# Patient Record
Sex: FEMALE | Race: WHITE | Employment: FULL TIME | URBAN - NONMETROPOLITAN AREA
[De-identification: names, ages, dates, MRNs, and addresses within clinical notes are randomized per-mention and may not be internally consistent; named-entity substitution may affect disease eponyms.]

---

## 2024-09-15 ENCOUNTER — HOSPITAL ENCOUNTER (EMERGENCY)
Age: 34
Discharge: HOME | End: 2024-09-15
Attending: EMERGENCY MEDICINE

## 2024-09-15 VITALS
SYSTOLIC BLOOD PRESSURE: 131 MMHG | HEIGHT: 68 IN | WEIGHT: 230 LBS | DIASTOLIC BLOOD PRESSURE: 81 MMHG | HEART RATE: 74 BPM | RESPIRATION RATE: 17 BRPM | OXYGEN SATURATION: 99 % | TEMPERATURE: 98.1 F | BODY MASS INDEX: 34.86 KG/M2

## 2024-09-15 DIAGNOSIS — R23.2 FLUSHING REACTION: ICD-10-CM

## 2024-09-15 DIAGNOSIS — R51.9 ACUTE NONINTRACTABLE HEADACHE, UNSPECIFIED HEADACHE TYPE: Primary | ICD-10-CM

## 2024-09-15 LAB
ALBUMIN SERPL-MCNC: 4.2 G/DL (ref 3.5–5.2)
ALP SERPL-CCNC: 67 U/L (ref 35–104)
ALT SERPL-CCNC: 9 U/L (ref 5–33)
ANION GAP SERPL CALCULATED.3IONS-SCNC: 12 MMOL/L (ref 7–19)
AST SERPL-CCNC: 11 U/L (ref 5–32)
BACTERIA URNS QL MICRO: NEGATIVE /HPF
BASOPHILS # BLD: 0 K/UL (ref 0–0.2)
BASOPHILS NFR BLD: 0.3 % (ref 0–1)
BILIRUB SERPL-MCNC: 0.2 MG/DL (ref 0.2–1.2)
BILIRUB UR QL STRIP: NEGATIVE
BUN SERPL-MCNC: 10 MG/DL (ref 6–20)
CALCIUM SERPL-MCNC: 9.1 MG/DL (ref 8.6–10)
CHLORIDE SERPL-SCNC: 105 MMOL/L (ref 98–111)
CLARITY UR: ABNORMAL
CO2 SERPL-SCNC: 25 MMOL/L (ref 22–29)
COLOR UR: YELLOW
CREAT SERPL-MCNC: 0.8 MG/DL (ref 0.5–0.9)
CRYSTALS URNS MICRO: ABNORMAL /HPF
EOSINOPHIL # BLD: 0.1 K/UL (ref 0–0.6)
EOSINOPHIL NFR BLD: 0.4 % (ref 0–5)
ERYTHROCYTE [DISTWIDTH] IN BLOOD BY AUTOMATED COUNT: 12.6 % (ref 11.5–14.5)
GLUCOSE SERPL-MCNC: 99 MG/DL (ref 70–99)
GLUCOSE UR STRIP.AUTO-MCNC: NEGATIVE MG/DL
HCG SERPL QL: NEGATIVE
HCT VFR BLD AUTO: 39.7 % (ref 37–47)
HGB BLD-MCNC: 13.2 G/DL (ref 12–16)
HGB UR STRIP.AUTO-MCNC: NEGATIVE MG/L
IMM GRANULOCYTES # BLD: 0.1 K/UL
KETONES UR STRIP.AUTO-MCNC: 15 MG/DL
LEUKOCYTE ESTERASE UR QL STRIP.AUTO: ABNORMAL
LYMPHOCYTES # BLD: 2 K/UL (ref 1.1–4.5)
LYMPHOCYTES NFR BLD: 14.6 % (ref 20–40)
MAGNESIUM SERPL-MCNC: 2.2 MG/DL (ref 1.6–2.6)
MCH RBC QN AUTO: 31.4 PG (ref 27–31)
MCHC RBC AUTO-ENTMCNC: 33.2 G/DL (ref 33–37)
MCV RBC AUTO: 94.3 FL (ref 81–99)
MONOCYTES # BLD: 0.9 K/UL (ref 0–0.9)
MONOCYTES NFR BLD: 6.6 % (ref 0–10)
MUCOUS THREADS URNS QL MICRO: ABNORMAL /LPF
NEUTROPHILS # BLD: 10.8 K/UL (ref 1.5–7.5)
NEUTS SEG NFR BLD: 77.5 % (ref 50–65)
NITRITE UR QL STRIP.AUTO: NEGATIVE
PH UR STRIP.AUTO: 6.5 [PH] (ref 5–8)
PLATELET # BLD AUTO: 305 K/UL (ref 130–400)
PMV BLD AUTO: 10.7 FL (ref 9.4–12.3)
POTASSIUM SERPL-SCNC: 3.6 MMOL/L (ref 3.5–5)
PROT SERPL-MCNC: 7.6 G/DL (ref 6.4–8.3)
PROT UR STRIP.AUTO-MCNC: ABNORMAL MG/DL
RBC # BLD AUTO: 4.21 M/UL (ref 4.2–5.4)
RBC #/AREA URNS HPF: ABNORMAL /HPF (ref 0–2)
SODIUM SERPL-SCNC: 142 MMOL/L (ref 136–145)
SP GR UR STRIP.AUTO: 1.03 (ref 1–1.03)
SQUAMOUS #/AREA URNS HPF: ABNORMAL /HPF
TSH SERPL DL<=0.005 MIU/L-ACNC: 1.45 UIU/ML (ref 0.27–4.2)
UROBILINOGEN UR STRIP.AUTO-MCNC: 1 E.U./DL
WBC # BLD AUTO: 13.9 K/UL (ref 4.8–10.8)
WBC #/AREA URNS HPF: ABNORMAL /HPF (ref 0–5)

## 2024-09-15 PROCEDURE — 84443 ASSAY THYROID STIM HORMONE: CPT

## 2024-09-15 PROCEDURE — 83735 ASSAY OF MAGNESIUM: CPT

## 2024-09-15 PROCEDURE — 99284 EMERGENCY DEPT VISIT MOD MDM: CPT

## 2024-09-15 PROCEDURE — 36415 COLL VENOUS BLD VENIPUNCTURE: CPT

## 2024-09-15 PROCEDURE — 81001 URINALYSIS AUTO W/SCOPE: CPT

## 2024-09-15 PROCEDURE — 85025 COMPLETE CBC W/AUTO DIFF WBC: CPT

## 2024-09-15 PROCEDURE — 93005 ELECTROCARDIOGRAM TRACING: CPT | Performed by: EMERGENCY MEDICINE

## 2024-09-15 PROCEDURE — 84703 CHORIONIC GONADOTROPIN ASSAY: CPT

## 2024-09-15 PROCEDURE — 80053 COMPREHEN METABOLIC PANEL: CPT

## 2024-09-16 LAB
EKG P AXIS: 20 DEGREES
EKG P-R INTERVAL: 184 MS
EKG Q-T INTERVAL: 426 MS
EKG QRS DURATION: 96 MS
EKG QTC CALCULATION (BAZETT): 425 MS
EKG T AXIS: 50 DEGREES

## 2024-09-16 PROCEDURE — 93010 ELECTROCARDIOGRAM REPORT: CPT | Performed by: INTERNAL MEDICINE

## 2025-02-12 DIAGNOSIS — Z00.00 ANNUAL PHYSICAL EXAM: Primary | ICD-10-CM

## 2025-02-12 DIAGNOSIS — F31.4 BIPOLAR DISORDER, CURRENT EPISODE DEPRESSED, SEVERE, WITHOUT PSYCHOTIC FEATURES (HCC): ICD-10-CM

## 2025-02-12 DIAGNOSIS — F41.1 GAD (GENERALIZED ANXIETY DISORDER): ICD-10-CM

## 2025-02-12 RX ORDER — CITALOPRAM HYDROBROMIDE 10 MG/1
10 TABLET ORAL DAILY
Qty: 30 TABLET | Refills: 0 | Status: SHIPPED | OUTPATIENT
Start: 2025-02-12

## 2025-02-12 NOTE — TELEPHONE ENCOUNTER
Gave 30 days with no refill.  Patient needs to schedule physical.  Patient has not been seen since January 2024

## 2025-02-12 NOTE — TELEPHONE ENCOUNTER
We can order routine labs but I would recommend her coming to the appointment fasting then go down after the visit for lab work in case she needs additional labs.

## 2025-02-12 NOTE — TELEPHONE ENCOUNTER
Call returned to pt to let her know. She has scheduled an apt on 3/7/25 @9 am and is wanting to get labs done that morning prior to the apt. Will send back to MM for lab orders

## 2025-02-12 NOTE — TELEPHONE ENCOUNTER
Received call/My Chart Message from patient requesting refill on medication(s). Pt was last seen in office on 1/10/24 and has a follow up scheduled for Visit date not found. Pt states she knows she needs to come in but she does not have insurance right now.     Will send request to provider for authorization.         Requested Prescriptions     Pending Prescriptions Disp Refills    citalopram (CELEXA) 10 MG tablet 90 tablet 1     Sig: Take 1 tablet by mouth daily

## 2025-03-07 ENCOUNTER — OFFICE VISIT (OUTPATIENT)
Age: 35
End: 2025-03-07

## 2025-03-07 VITALS
HEART RATE: 79 BPM | HEIGHT: 68 IN | DIASTOLIC BLOOD PRESSURE: 60 MMHG | OXYGEN SATURATION: 98 % | TEMPERATURE: 97.2 F | WEIGHT: 238.38 LBS | BODY MASS INDEX: 36.13 KG/M2 | SYSTOLIC BLOOD PRESSURE: 100 MMHG

## 2025-03-07 DIAGNOSIS — Z00.00 ANNUAL PHYSICAL EXAM: ICD-10-CM

## 2025-03-07 DIAGNOSIS — F31.32 BIPOLAR AFFECTIVE DISORDER, CURRENTLY DEPRESSED, MODERATE (HCC): ICD-10-CM

## 2025-03-07 DIAGNOSIS — Z72.89 CURRENT EVERY DAY VAPING: ICD-10-CM

## 2025-03-07 DIAGNOSIS — F41.1 GAD (GENERALIZED ANXIETY DISORDER): ICD-10-CM

## 2025-03-07 DIAGNOSIS — Z00.00 ENCOUNTER FOR WELL ADULT EXAM WITHOUT ABNORMAL FINDINGS: Primary | ICD-10-CM

## 2025-03-07 LAB
ALBUMIN SERPL-MCNC: 4.5 G/DL (ref 3.5–5.2)
ALP SERPL-CCNC: 68 U/L (ref 35–104)
ALT SERPL-CCNC: 12 U/L (ref 10–35)
ANION GAP SERPL CALCULATED.3IONS-SCNC: 12 MMOL/L (ref 8–16)
AST SERPL-CCNC: 14 U/L (ref 10–35)
BASOPHILS # BLD: 0.1 K/UL (ref 0–0.2)
BASOPHILS NFR BLD: 1 % (ref 0–1)
BILIRUB SERPL-MCNC: 0.4 MG/DL (ref 0.2–1.2)
BUN SERPL-MCNC: 6 MG/DL (ref 6–20)
CALCIUM SERPL-MCNC: 9.4 MG/DL (ref 8.6–10)
CHLORIDE SERPL-SCNC: 104 MMOL/L (ref 98–107)
CHOLEST SERPL-MCNC: 156 MG/DL (ref 0–199)
CO2 SERPL-SCNC: 24 MMOL/L (ref 22–29)
CREAT SERPL-MCNC: 0.8 MG/DL (ref 0.5–0.9)
CREAT UR-MCNC: 290 MG/DL (ref 28–217)
EOSINOPHIL # BLD: 0.2 K/UL (ref 0–0.6)
EOSINOPHIL NFR BLD: 3.3 % (ref 0–5)
ERYTHROCYTE [DISTWIDTH] IN BLOOD BY AUTOMATED COUNT: 12.9 % (ref 11.5–14.5)
GLUCOSE SERPL-MCNC: 94 MG/DL (ref 70–99)
HBA1C MFR BLD: 5.2 % (ref 4–5.6)
HCT VFR BLD AUTO: 45.5 % (ref 37–47)
HDLC SERPL-MCNC: 42 MG/DL (ref 40–60)
HGB BLD-MCNC: 14.8 G/DL (ref 12–16)
IMM GRANULOCYTES # BLD: 0 K/UL
LDLC SERPL CALC-MCNC: 97 MG/DL
LYMPHOCYTES # BLD: 2 K/UL (ref 1.1–4.5)
LYMPHOCYTES NFR BLD: 28.7 % (ref 20–40)
MCH RBC QN AUTO: 30.5 PG (ref 27–31)
MCHC RBC AUTO-ENTMCNC: 32.5 G/DL (ref 33–37)
MCV RBC AUTO: 93.6 FL (ref 81–99)
MICROALBUMIN UR-MCNC: <1.2 MG/DL (ref 0–1.99)
MICROALBUMIN/CREAT UR-RTO: ABNORMAL MG/G (ref 0–29)
MONOCYTES # BLD: 0.5 K/UL (ref 0–0.9)
MONOCYTES NFR BLD: 7 % (ref 0–10)
NEUTROPHILS # BLD: 4.2 K/UL (ref 1.5–7.5)
NEUTS SEG NFR BLD: 59.6 % (ref 50–65)
PLATELET # BLD AUTO: 335 K/UL (ref 130–400)
PMV BLD AUTO: 11.3 FL (ref 9.4–12.3)
POTASSIUM SERPL-SCNC: 3.9 MMOL/L (ref 3.5–5.1)
PROT SERPL-MCNC: 7.5 G/DL (ref 6.4–8.3)
RBC # BLD AUTO: 4.86 M/UL (ref 4.2–5.4)
SODIUM SERPL-SCNC: 140 MMOL/L (ref 136–145)
T4 FREE SERPL-MCNC: 1.08 NG/DL (ref 0.93–1.7)
TRIGL SERPL-MCNC: 84 MG/DL (ref 0–149)
TSH SERPL DL<=0.005 MIU/L-ACNC: 1.21 UIU/ML (ref 0.27–4.2)
WBC # BLD AUTO: 7 K/UL (ref 4.8–10.8)

## 2025-03-07 PROCEDURE — 99395 PREV VISIT EST AGE 18-39: CPT | Performed by: NURSE PRACTITIONER

## 2025-03-07 RX ORDER — DIPHENHYDRAMINE HCL 25 MG
25 CAPSULE ORAL NIGHTLY PRN
COMMUNITY

## 2025-03-07 RX ORDER — LAMOTRIGINE 150 MG/1
150 TABLET ORAL DAILY
Qty: 90 TABLET | Refills: 3 | Status: SHIPPED | OUTPATIENT
Start: 2025-03-07

## 2025-03-07 RX ORDER — CITALOPRAM HYDROBROMIDE 20 MG/1
20 TABLET ORAL DAILY
Qty: 90 TABLET | Refills: 3 | Status: SHIPPED | OUTPATIENT
Start: 2025-03-07

## 2025-03-07 SDOH — ECONOMIC STABILITY: FOOD INSECURITY: WITHIN THE PAST 12 MONTHS, THE FOOD YOU BOUGHT JUST DIDN'T LAST AND YOU DIDN'T HAVE MONEY TO GET MORE.: NEVER TRUE

## 2025-03-07 SDOH — ECONOMIC STABILITY: FOOD INSECURITY: WITHIN THE PAST 12 MONTHS, YOU WORRIED THAT YOUR FOOD WOULD RUN OUT BEFORE YOU GOT MONEY TO BUY MORE.: NEVER TRUE

## 2025-03-07 ASSESSMENT — ENCOUNTER SYMPTOMS
SORE THROAT: 0
DIARRHEA: 0
CONSTIPATION: 0
SHORTNESS OF BREATH: 0
VOMITING: 0
EYE REDNESS: 0
COUGH: 0
RHINORRHEA: 0

## 2025-03-07 ASSESSMENT — PATIENT HEALTH QUESTIONNAIRE - PHQ9
2. FEELING DOWN, DEPRESSED OR HOPELESS: SEVERAL DAYS
4. FEELING TIRED OR HAVING LITTLE ENERGY: SEVERAL DAYS
6. FEELING BAD ABOUT YOURSELF - OR THAT YOU ARE A FAILURE OR HAVE LET YOURSELF OR YOUR FAMILY DOWN: SEVERAL DAYS
9. THOUGHTS THAT YOU WOULD BE BETTER OFF DEAD, OR OF HURTING YOURSELF: NOT AT ALL
SUM OF ALL RESPONSES TO PHQ QUESTIONS 1-9: 8
8. MOVING OR SPEAKING SO SLOWLY THAT OTHER PEOPLE COULD HAVE NOTICED. OR THE OPPOSITE, BEING SO FIGETY OR RESTLESS THAT YOU HAVE BEEN MOVING AROUND A LOT MORE THAN USUAL: SEVERAL DAYS
5. POOR APPETITE OR OVEREATING: MORE THAN HALF THE DAYS
SUM OF ALL RESPONSES TO PHQ QUESTIONS 1-9: 8
7. TROUBLE CONCENTRATING ON THINGS, SUCH AS READING THE NEWSPAPER OR WATCHING TELEVISION: NOT AT ALL
SUM OF ALL RESPONSES TO PHQ QUESTIONS 1-9: 8
3. TROUBLE FALLING OR STAYING ASLEEP: NOT AT ALL
10. IF YOU CHECKED OFF ANY PROBLEMS, HOW DIFFICULT HAVE THESE PROBLEMS MADE IT FOR YOU TO DO YOUR WORK, TAKE CARE OF THINGS AT HOME, OR GET ALONG WITH OTHER PEOPLE: NOT DIFFICULT AT ALL
1. LITTLE INTEREST OR PLEASURE IN DOING THINGS: MORE THAN HALF THE DAYS
SUM OF ALL RESPONSES TO PHQ QUESTIONS 1-9: 8

## 2025-03-07 NOTE — ASSESSMENT & PLAN NOTE
-Increase Lamictal from 100 mg up to 150 mg.    Orders:    lamoTRIgine (LAMICTAL) 150 MG tablet; Take 1 tablet by mouth daily

## 2025-03-07 NOTE — PROGRESS NOTES
3/7/2025    Tania Galvan (:  1990) is a 34 y.o. female, here for a preventive medicine evaluation.    Eyes:  Yes  Dentist:  Yes in January.  \"I had a wisdom tooth pulled out.\"  SBE:  Yes  Mammo:  No breast cancer screening on file  Denies family hx of breat cancer.  Pap:  Date of last Cervical Cancer screen (HPV or PAP): 10/19/2018  Recommend PAP smear  Menses: \"I just had one last week.\"  \"It was the first one I had in 6 months.\"  Colonoscopy:  No colonoscopy on file   No cologuard on file  No FIT/FOBT on file   No flexible sigmoidoscopy on file  Denies blood in stool  MVI:  No  Supplements:  No  Asa:  No  Labs:  Due  Exercise:  \"I don't have the energy or motivation to exercise.\"  \"I have no interest in doing anything outside.\"  Body Image: \"My weight has been really bothering me.\"  Diet and Nutr: No special diets  \"I try to eat as healthy as possible.\"  Depression:  She was in counseling regularly until her insurance lapsed.  Her insurance lapsed in October.  \"She knows when I get my insurance back, I am coming back.\"  She continues on Lamictal 100 mg daily & Celexa 10 mg daily.  \"I need my meds increased.\"  Reports increased anxiety.  \"My MIL starts chemo next week.\"  \"My sister has to have massive back surgery and I have to take care of her.\"  \"I feel like I am in a depression but I am not.\"  Tobacco:  \"I vape more than I smoke.\"  <1/2 ppd  Substance: Rare ETOH  Marijuana daily  Sleep: Fabulous  \"My head hits the pillow, I am out.\"  Cognition: No concerns    Subjective   Patient Active Problem List   Diagnosis    Bipolar 1 disorder (HCC)    PCOS (polycystic ovarian syndrome)    Pilonidal cyst    Depression with suicidal ideation    Bipolar disorder, current episode depressed, severe, without psychotic features (HCC)       Review of Systems   Constitutional:  Positive for activity change (decreased). Negative for chills, fatigue and fever.   HENT:  Negative for congestion, ear pain, rhinorrhea

## 2025-03-07 NOTE — PATIENT INSTRUCTIONS
hands, brush your teeth twice a day, and wear a seat belt in the car.   Where can you learn more?  Go to [email or web address]://[email or web address]/patientEd and enter P072 to learn more about \"Well Visit, Ages 18 to 65: Care Instructions.\"  Current as of: April 30, 2024  Content Version: 14.3  © 2024 [employer].   Care instructions adapted under license by [employer]. If you have questions about a medical condition or this instruction, always ask your healthcare professional. [employer] disclaims any warranty or liability for your use of this information.

## 2025-03-10 ENCOUNTER — RESULTS FOLLOW-UP (OUTPATIENT)
Age: 35
End: 2025-03-10

## 2025-03-10 NOTE — TELEPHONE ENCOUNTER
----- Message from LOUIS Bustillos sent at 3/7/2025 10:26 PM CST -----  No microalbumin in   CBC: WNL  Thyroid: WNL  A1c: WNL  Cholesterol is WNL  CMP: WNL

## 2025-05-07 ENCOUNTER — OFFICE VISIT (OUTPATIENT)
Age: 35
End: 2025-05-07

## 2025-05-07 VITALS
OXYGEN SATURATION: 98 % | WEIGHT: 242.38 LBS | DIASTOLIC BLOOD PRESSURE: 80 MMHG | TEMPERATURE: 97.8 F | SYSTOLIC BLOOD PRESSURE: 110 MMHG | BODY MASS INDEX: 36.73 KG/M2 | HEART RATE: 93 BPM | HEIGHT: 68 IN

## 2025-05-07 DIAGNOSIS — J30.1 SEASONAL ALLERGIC RHINITIS DUE TO POLLEN: ICD-10-CM

## 2025-05-07 DIAGNOSIS — F31.32 BIPOLAR AFFECTIVE DISORDER, CURRENTLY DEPRESSED, MODERATE (HCC): ICD-10-CM

## 2025-05-07 DIAGNOSIS — F41.1 GAD (GENERALIZED ANXIETY DISORDER): Primary | ICD-10-CM

## 2025-05-07 PROCEDURE — 99213 OFFICE O/P EST LOW 20 MIN: CPT | Performed by: NURSE PRACTITIONER

## 2025-05-07 RX ORDER — MONTELUKAST SODIUM 10 MG/1
10 TABLET ORAL DAILY
Qty: 30 TABLET | Refills: 5 | Status: SHIPPED | OUTPATIENT
Start: 2025-05-07

## 2025-05-07 RX ORDER — LORATADINE 10 MG/1
10 TABLET ORAL DAILY
Qty: 30 TABLET | Refills: 5 | Status: SHIPPED | OUTPATIENT
Start: 2025-05-07

## 2025-05-07 SDOH — ECONOMIC STABILITY: FOOD INSECURITY: WITHIN THE PAST 12 MONTHS, YOU WORRIED THAT YOUR FOOD WOULD RUN OUT BEFORE YOU GOT MONEY TO BUY MORE.: NEVER TRUE

## 2025-05-07 SDOH — ECONOMIC STABILITY: FOOD INSECURITY: WITHIN THE PAST 12 MONTHS, THE FOOD YOU BOUGHT JUST DIDN'T LAST AND YOU DIDN'T HAVE MONEY TO GET MORE.: NEVER TRUE

## 2025-05-07 ASSESSMENT — ENCOUNTER SYMPTOMS
SHORTNESS OF BREATH: 0
SORE THROAT: 0
COUGH: 0
DIARRHEA: 0
CONSTIPATION: 0
VOMITING: 0
EYE REDNESS: 0
RHINORRHEA: 1

## 2025-05-07 ASSESSMENT — PATIENT HEALTH QUESTIONNAIRE - PHQ9
8. MOVING OR SPEAKING SO SLOWLY THAT OTHER PEOPLE COULD HAVE NOTICED. OR THE OPPOSITE, BEING SO FIGETY OR RESTLESS THAT YOU HAVE BEEN MOVING AROUND A LOT MORE THAN USUAL: SEVERAL DAYS
4. FEELING TIRED OR HAVING LITTLE ENERGY: NOT AT ALL
SUM OF ALL RESPONSES TO PHQ QUESTIONS 1-9: 3
3. TROUBLE FALLING OR STAYING ASLEEP: SEVERAL DAYS
10. IF YOU CHECKED OFF ANY PROBLEMS, HOW DIFFICULT HAVE THESE PROBLEMS MADE IT FOR YOU TO DO YOUR WORK, TAKE CARE OF THINGS AT HOME, OR GET ALONG WITH OTHER PEOPLE: SOMEWHAT DIFFICULT
SUM OF ALL RESPONSES TO PHQ QUESTIONS 1-9: 3
2. FEELING DOWN, DEPRESSED OR HOPELESS: NOT AT ALL
9. THOUGHTS THAT YOU WOULD BE BETTER OFF DEAD, OR OF HURTING YOURSELF: NOT AT ALL
5. POOR APPETITE OR OVEREATING: SEVERAL DAYS
6. FEELING BAD ABOUT YOURSELF - OR THAT YOU ARE A FAILURE OR HAVE LET YOURSELF OR YOUR FAMILY DOWN: NOT AT ALL
7. TROUBLE CONCENTRATING ON THINGS, SUCH AS READING THE NEWSPAPER OR WATCHING TELEVISION: NOT AT ALL
SUM OF ALL RESPONSES TO PHQ QUESTIONS 1-9: 3
SUM OF ALL RESPONSES TO PHQ QUESTIONS 1-9: 3
1. LITTLE INTEREST OR PLEASURE IN DOING THINGS: NOT AT ALL

## 2025-05-07 NOTE — PROGRESS NOTES
Tania Galvan (:  1990) is a 34 y.o. female, Established patient, here for evaluation of the following chief complaint(s):  moods     Assessment & Plan  1. Mood disorder: Stable  - Celexa increased from 10 mg to 20 mg a few months ago and Lamictal continued at 150 mg daily.  - Unable to resume counseling due to insurance issues; advised to attempt reinstatement of insurance.  - Maintain current medication regimen.  - Referral for counseling will be provided upon request.  -\"Today is a bad day, but overall I am doing better.\"    2. Right eye stye: drained a few days ago  - Minor drainage possibly allergy-related.  - Warm compresses recommended for relief.  - Monitor for changes or persistent symptoms.    3. Allergic rhinitis.  - Congestion and postnasal drip.  - Prescriptions for Singulair 10 mg and Claritin sent to pharmacy; advised to use GoodRx to reduce cost.  - Continue managing symptoms with prescribed medications and over-the-counter options as needed.    Follow-up  - Follow up in 6 months or sooner if needed.    Results        ICD-10-CM    1. RAMOS (generalized anxiety disorder)  F41.1 Stable  Continue Celexa 20 mg      2. Seasonal allergic rhinitis due to pollen  J30.1 montelukast (SINGULAIR) 10 MG tablet     loratadine (CLARITIN) 10 MG tablet      3. Bipolar affective disorder, currently depressed, moderate ([facility])  F31.32 Stable  Continue Celexa 20 mg and Lamictal 150 mg daily        Return in about 6 months (around 2025), or if symptoms worsen or fail to improve.       Subjective   History of Present Illness  The patient is a 34-year-old female who is following up on her mood. She is currently taking Celexa 20 mg daily and Lamictal 150 mg daily. She was last seen in the office on 2025, at which time her citalopram was increased from 10 mg to 20 mg.    Mood and Fatigue  - She has been unable to reinstate her insurance since losing it in 10/2024, which has prevented her from resuming